# Patient Record
(demographics unavailable — no encounter records)

---

## 2025-03-13 NOTE — DISCUSSION/SUMMARY
[FreeTextEntry1] : 25 YEAR OLD FEMALE ON OCP LMP 3/11/2025 WITH CYCLES MONTHLY  X 3 ,NOT HEAVY OR PAINFUL ON OCP (PAINFUL BEFORE), PRESENTS FOR 6 MONTH FOLLOW UP VISIT FOR CONTINUED SURVEILLANCE OF OCP. AND FOR FOLLOW UP PAP AND HPV HR TESTNIG. LAST SEEN FOR WELL WOMAN VISIT AND PAP 8/20/2024 ; PAP AT THAT TIME WAS NEGATIVE WITH HPV HR TESTING +  BUT NEGATIVE FOR 16, 18/45.  NO NEW MEDICAL OR SURGICAL HISTORY SINCE LAST VISIT. MEDICATIONS; JUNEL FE .15/30; LEVOTHYXOINE -NO CHANGE IN DOSAGE MEDICATION ALLERGIES; NONE ROS; NORMAL ; NO FAM HSTORY OF COLON CANCER           NO URNARY COMPLAINTS           SEXUALLY ACTIVE WITHOUT COMPLAINTS WITH SAME PARTNER -OCP BREASTS' DOES NOT DO BREAST SELF EXAMINATION                    NO FAM HISTORY OF BREAST CANCER +EXERCISES OCCASIONALLY; NO MULTIVITAMINS; +DAIRY/CHEESE; NO TOBACCO: + VAPING  PE;/68; TEMP 97.8;WEIGHT 247 LB HEIGHT 5'6"      BREASTS; NO MASSES OR DISCHARGES      ABDOMEN;SOFT, NO MASSES; NO TENDERNESS TO PALPATION'      PELVIC NORMAL EXTERNAL GENIATALIA                    NORMAL URETHRAL MEATUS                    NORMAL VAGINA WITHOUT LESION                    NORMAL CERVIX-SLIGHT BLOOD FROM PERIOD                   ANTEVERTED NORMAL UTERUS ON BIMANUAL EXAMIANTION                   NO PALPABLE ADNEXAL MASSSES  IMP; ENCOUNTER FOR SURVEILLANCE OF OCP         + HPV HR   TESTING OF CERVIX        DYSMENORRHEA RELIEVED BY OCP        HYPOTHYROID  PLAN; THIN PREP PAP WITH HR HPV TESTING DONE-PT TO CALL IN 2 WKS FOR RESULTS             BREAST SELF EXAMINATION MONTHLY CONTINUED TO BE STRONGLY ADVISED             E PRESCRIBED OCP JUNEL FE 1.5 /30 X 6 MONTHS            VAPING CESSATION ADVISED            RETURN TO OFFICE 6 MONTS FOR WELL  WOMAN VISIT OR RTO PRN

## 2025-04-22 NOTE — PLAN
[FreeTextEntry1] : 25 YEAR OLD FEMALE LMP 4/3/2025 PRESENTS FOR FOLLOW UP TESTING , COLPOSCOPIC EXAMINATION , FOR RECURRENT POSITIVE HPV HR OF CERVIX. PT WAS RECENTLY SEEN, MARCH 11/2025 , AT WHICH TIME REPEAT PAP AND HPV HR TESTING AGAIN WAS REPORTED AS NEGATIVE PAP AND HPV HR + BUT NEGATIVE FOR 16, 18/45.  PT HAD SIMILAR RESULTS 8/2024 ABD JULY 31 , 2023.  PT HAS NOT HAD COLPOSCOPIC EXAM PREVIOUSLY.  PE;/68; TEMP 97.8 WEIGHT 240 LBS HEIGHT 5'6"   PROCEDURE; COLPOSCOPY EXPLAINED TO PT. R/B/A DISCUSSED. ALL QUESTIONS ANSWERED. CONSENT SIGNED AND WITNESSED.  TIME OUT PERFORMED IN THE USUAL MANNER.  STERILE SPECULUM PLACED INTO THE VAGINA AND THE CERVIX VISUALIZED. COLPOSCOPY PERFORMED IN THE USUAL MANNER, WITH AND WITHOUT MAGNIFICATION, WITH AND WITHOUT ACETIC ACID, WITH AND WITHOUT GREEN FILTER.  NO ABNORMALITIES OF THE EXOCERIX NOTED. ENDTIRE T ZONE VISUZLIZED. NO BIOPSIES TAKEN. PT ALREADY HAS APPT FOR FOLLOW UP VISIT IN 6 MONTHS AND WILL REPEAT PAP AND HPV HR TESTING AT THAT TIME.  ALSO DISCUSSED WITH PATIENT, AND HER MOTHER WHO WAS PRESENT AT THE TIME OF THE EXAMINATION, HPV VACCINATION. PT HAS NOT BEEN VACCINATED. MOTHER HAD BEEN ADVISED NOT TO GET HER VACCINATED WHEN SHE WAS YOUNGER. EXPLAINED GARDASIL 9 AND ENCOURAGED PT TO CONSIDER GETTING VACCINATED TO HELP PREVENT ACQUIRING ANY OF THE HPV STRAINS SHE DOES NOT HAVE ALREADY AND TO PREVENT TRANSMISSION TO OTHERS. PT TO CONSIDER.